# Patient Record
Sex: FEMALE | Race: WHITE | Employment: FULL TIME | URBAN - NONMETROPOLITAN AREA
[De-identification: names, ages, dates, MRNs, and addresses within clinical notes are randomized per-mention and may not be internally consistent; named-entity substitution may affect disease eponyms.]

---

## 2017-12-26 ENCOUNTER — HOSPITAL ENCOUNTER (OUTPATIENT)
Age: 62
Discharge: HOME | End: 2017-12-26
Payer: COMMERCIAL

## 2017-12-26 LAB
ABSOLUTE EOS #: 0.6 K/UL (ref 0–0.4)
ABSOLUTE IMMATURE GRANULOCYTE: ABNORMAL K/UL (ref 0–0.3)
ABSOLUTE LYMPH #: 2.2 K/UL (ref 1–4.8)
ABSOLUTE MONO #: 0.3 K/UL (ref 0–1)
ALBUMIN SERPL-MCNC: 4.2 G/DL (ref 3.5–5.2)
ALBUMIN/GLOBULIN RATIO: 1.4 (ref 1–2.5)
ALP BLD-CCNC: 50 U/L (ref 35–104)
ALT SERPL-CCNC: 19 U/L (ref 5–33)
ANION GAP SERPL CALCULATED.3IONS-SCNC: 13 MMOL/L (ref 9–17)
AST SERPL-CCNC: 21 U/L
BASOPHILS # BLD: 1 % (ref 0–2)
BASOPHILS ABSOLUTE: 0 K/UL (ref 0–0.2)
BILIRUB SERPL-MCNC: 0.51 MG/DL (ref 0.3–1.2)
BUN BLDV-MCNC: 16 MG/DL (ref 8–23)
BUN/CREAT BLD: 22 (ref 9–20)
CALCIUM SERPL-MCNC: 9.8 MG/DL (ref 8.6–10.4)
CHLORIDE BLD-SCNC: 100 MMOL/L (ref 98–107)
CHOLESTEROL/HDL RATIO: 4.5
CHOLESTEROL: 207 MG/DL
CO2: 27 MMOL/L (ref 20–31)
CREAT SERPL-MCNC: 0.72 MG/DL (ref 0.5–0.9)
DIFFERENTIAL TYPE: ABNORMAL
EOSINOPHILS RELATIVE PERCENT: 6 % (ref 0–8)
ESTIMATED AVERAGE GLUCOSE: 134 MG/DL
GFR AFRICAN AMERICAN: >60 ML/MIN
GFR NON-AFRICAN AMERICAN: >60 ML/MIN
GFR SERPL CREATININE-BSD FRML MDRD: ABNORMAL ML/MIN/{1.73_M2}
GFR SERPL CREATININE-BSD FRML MDRD: ABNORMAL ML/MIN/{1.73_M2}
GLUCOSE BLD-MCNC: 148 MG/DL (ref 70–99)
HBA1C MFR BLD: 6.3 % (ref 4.8–5.9)
HCT VFR BLD CALC: 41.6 % (ref 36–46)
HDLC SERPL-MCNC: 46 MG/DL
HEMOGLOBIN: 13.3 G/DL (ref 12–16)
IMMATURE GRANULOCYTES: ABNORMAL %
INSULIN COMMENT: 1153
INSULIN REFERENCE RANGE:: NORMAL
INSULIN: 22 MU/L
IRON: 54 UG/DL (ref 37–145)
LDL CHOLESTEROL: 132 MG/DL (ref 0–130)
LYMPHOCYTES # BLD: 23 % (ref 24–44)
MCH RBC QN AUTO: 25.7 PG (ref 26–34)
MCHC RBC AUTO-ENTMCNC: 32 G/DL (ref 31–37)
MCV RBC AUTO: 80.5 FL (ref 80–100)
MONOCYTES # BLD: 3 % (ref 0–12)
PDW BLD-RTO: 14.5 % (ref 12.1–15.2)
PLATELET # BLD: 258 K/UL (ref 140–450)
PLATELET ESTIMATE: ABNORMAL
PMV BLD AUTO: 9.4 FL (ref 6–12)
POTASSIUM SERPL-SCNC: 5 MMOL/L (ref 3.7–5.3)
RBC # BLD: 5.17 M/UL (ref 4–5.2)
RBC # BLD: ABNORMAL 10*6/UL
SEG NEUTROPHILS: 67 % (ref 36–66)
SEGMENTED NEUTROPHILS ABSOLUTE COUNT: 6.6 K/UL (ref 1.8–7.7)
SODIUM BLD-SCNC: 140 MMOL/L (ref 135–144)
T4 TOTAL: 11.9 UG/DL (ref 4.5–12)
THYROXINE UPTAKE: 35.28 % (ref 28–41)
TOTAL PROTEIN: 7.3 G/DL (ref 6.4–8.3)
TRIGL SERPL-MCNC: 146 MG/DL
TSH SERPL DL<=0.05 MIU/L-ACNC: 0.04 MIU/L (ref 0.3–5)
VLDLC SERPL CALC-MCNC: ABNORMAL MG/DL (ref 1–30)
WBC # BLD: 9.8 K/UL (ref 3.5–11)
WBC # BLD: ABNORMAL 10*3/UL

## 2017-12-26 PROCEDURE — 84436 ASSAY OF TOTAL THYROXINE: CPT

## 2017-12-26 PROCEDURE — 84443 ASSAY THYROID STIM HORMONE: CPT

## 2017-12-26 PROCEDURE — 36415 COLL VENOUS BLD VENIPUNCTURE: CPT

## 2017-12-26 PROCEDURE — 83525 ASSAY OF INSULIN: CPT

## 2017-12-26 PROCEDURE — 80061 LIPID PANEL: CPT

## 2017-12-26 PROCEDURE — 85025 COMPLETE CBC W/AUTO DIFF WBC: CPT

## 2017-12-26 PROCEDURE — 80053 COMPREHEN METABOLIC PANEL: CPT

## 2017-12-26 PROCEDURE — 83036 HEMOGLOBIN GLYCOSYLATED A1C: CPT

## 2017-12-26 PROCEDURE — 84479 ASSAY OF THYROID (T3 OR T4): CPT

## 2017-12-26 PROCEDURE — 83540 ASSAY OF IRON: CPT

## 2023-07-24 ENCOUNTER — HOSPITAL ENCOUNTER
Dept: HOSPITAL 101 - LAB | Age: 68
Discharge: HOME | End: 2023-07-24
Payer: COMMERCIAL

## 2023-07-24 DIAGNOSIS — Z79.899: Primary | ICD-10-CM

## 2023-07-24 DIAGNOSIS — E78.5: ICD-10-CM

## 2023-07-24 DIAGNOSIS — E11.9: ICD-10-CM

## 2023-07-24 DIAGNOSIS — R53.83: ICD-10-CM

## 2023-07-24 DIAGNOSIS — Z12.11: ICD-10-CM

## 2023-07-24 LAB
ADD MANUAL DIFF: NO
ALANINE AMINOTRANSFERASE: 24 U/L (ref 14–59)
ALBUMIN GLOBULIN RATIO: 1
ALBUMIN LEVEL: 3.8 G/DL (ref 3.4–5)
ALKALINE PHOSPHATASE: 62 U/L (ref 46–116)
ANION GAP: 13.4
ASPARTATE AMINO TRANSFERASE: 15 U/L (ref 15–37)
BLOOD UREA NITROGEN: 17 MG/DL (ref 7–18)
CALCIUM: 9.6 MG/DL (ref 8.5–10.1)
CARBON DIOXIDE: 27.3 MMOL/L (ref 21–32)
CHLORIDE: 103 MMOL/L (ref 98–107)
CHOLESTEROL: 209 MG/DL (ref ?–200)
CO2 BLD-SCNC: 27.3 MMOL/L (ref 21–32)
ESTIMATED GFR (AFRICAN AMERICA: 56 (ref 60–?)
ESTIMATED GFR (NON-AFRICAN AME: 46 (ref 60–?)
FREE T3: 2.61 PG/ML (ref 2.18–3.98)
GLOBULIN: 3.7 G/DL
GLUCOSE BLD-MCNC: 206 MG/DL (ref 74–106)
HCT VFR BLD CALC: 39.9 % (ref 36–48)
HDL CHOLESTEROL: 39 MG/DL (ref 40–60)
HEMATOCRIT: 39.9 % (ref 36–48)
HEMOGLOBIN: 12.9 G/DL (ref 12–16)
IMMATURE GRANULOCYTES ABS AUTO: 0.02 10^3/UL (ref 0–0.03)
IMMATURE GRANULOCYTES PCT AUTO: 0.3 % (ref 0–0.5)
LYMPHOCYTES  ABSOLUTE AUTO: 2.2 10^3/UL (ref 1.2–3.8)
MCV RBC: 85.1 FL (ref 81–99)
MEAN CORPUSCULAR HEMOGLOBIN: 27.5 PG (ref 26.7–34)
MEAN CORPUSCULAR HGB CONC: 32.3 G/DL (ref 29.9–35.2)
MEAN CORPUSCULAR VOLUME: 85.1 FL (ref 81–99)
PLATELET # BLD: 245 10^3/UL (ref 150–450)
PLATELET COUNT: 245 10^3/UL (ref 150–450)
POTASSIUM SERPLBLD-SCNC: 4.7 MMOL/L (ref 3.5–5.1)
POTASSIUM: 4.7 MMOL/L (ref 3.5–5.1)
RED BLOOD COUNT: 4.69 10^6/UL (ref 4.2–5.4)
SODIUM BLD-SCNC: 139 MMOL/L (ref 136–145)
SODIUM: 139 MMOL/L (ref 136–145)
THYROID STIMULATING HORMONE: 0.16 UIU/ML (ref 0.36–3.74)
TOTAL PROTEIN: 7.5 G/DL (ref 6.4–8.2)
TRIGLYCERIDES: 251 MG/DL (ref ?–150)
VLDL CHOLESTEROL: 50.2 MG/DL
WBC # BLD: 7.9 10^3/UL (ref 4–11)
WHITE BLOOD COUNT: 7.9 10^3/UL (ref 4–11)

## 2023-07-24 PROCEDURE — 84436 ASSAY OF TOTAL THYROXINE: CPT

## 2023-07-24 PROCEDURE — 36415 COLL VENOUS BLD VENIPUNCTURE: CPT

## 2023-07-24 PROCEDURE — 83036 HEMOGLOBIN GLYCOSYLATED A1C: CPT

## 2023-07-24 PROCEDURE — 80053 COMPREHEN METABOLIC PANEL: CPT

## 2023-07-24 PROCEDURE — 84481 FREE ASSAY (FT-3): CPT

## 2023-07-24 PROCEDURE — 80061 LIPID PANEL: CPT

## 2023-07-24 PROCEDURE — 84443 ASSAY THYROID STIM HORMONE: CPT

## 2023-07-24 PROCEDURE — 85025 COMPLETE CBC W/AUTO DIFF WBC: CPT

## 2023-12-04 ENCOUNTER — HOSPITAL ENCOUNTER
Dept: HOSPITAL 101 - RAD | Age: 68
Discharge: HOME | End: 2023-12-04
Payer: COMMERCIAL

## 2023-12-04 DIAGNOSIS — M54.16: Primary | ICD-10-CM

## 2023-12-04 DIAGNOSIS — M51.36: ICD-10-CM

## 2023-12-04 PROCEDURE — 72100 X-RAY EXAM L-S SPINE 2/3 VWS: CPT

## 2024-09-25 ENCOUNTER — HOSPITAL ENCOUNTER
Dept: HOSPITAL 101 - LAB | Age: 69
Discharge: HOME | End: 2024-09-25
Payer: COMMERCIAL

## 2024-09-25 DIAGNOSIS — Z00.00: Primary | ICD-10-CM

## 2024-09-25 LAB
ADD MANUAL DIFF: NO
ALANINE AMINOTRANSFERASE: 20 U/L (ref 14–59)
ALBUMIN GLOBULIN RATIO: 1.1
ALBUMIN LEVEL: 3.7 G/DL (ref 3.4–5)
ALKALINE PHOSPHATASE: 57 U/L (ref 46–116)
ANION GAP: 13.1
ASPARTATE AMINO TRANSFERASE: 14 U/L (ref 15–37)
BLOOD UREA NITROGEN: 20 MG/DL (ref 7–18)
CALCIUM: 9.8 MG/DL (ref 8.5–10.1)
CARBON DIOXIDE: 26.5 MMOL/L (ref 21–32)
CHLORIDE: 100 MMOL/L (ref 98–107)
CHOLESTEROL: 135 MG/DL (ref ?–200)
CO2 BLD-SCNC: 26.5 MMOL/L (ref 21–32)
ESTIMATED GFR (AFRICAN AMERICA: 46 (ref 60–?)
ESTIMATED GFR (NON-AFRICAN AME: 38 (ref 60–?)
FREE T3: 2.47 PG/ML (ref 2.18–3.98)
GLOBULIN: 3.4 G/DL
GLUCOSE BLD-MCNC: 200 MG/DL (ref 74–106)
HCT VFR BLD CALC: 40.9 % (ref 36–48)
HDL CHOLESTEROL: 46 MG/DL (ref 40–60)
HEMATOCRIT: 40.9 % (ref 36–48)
HEMOGLOBIN: 13.1 G/DL (ref 12–16)
IMMATURE GRANULOCYTES ABS AUTO: 0.02 10^3/UL (ref 0–0.03)
IMMATURE GRANULOCYTES PCT AUTO: 0.2 % (ref 0–0.5)
LYMPHOCYTES  ABSOLUTE AUTO: 2.2 10^3/UL (ref 1.2–3.8)
MCV RBC: 84.3 FL (ref 81–99)
MEAN CORPUSCULAR HEMOGLOBIN: 27 PG (ref 26.7–34)
MEAN CORPUSCULAR HGB CONC: 32 G/DL (ref 29.9–35.2)
MEAN CORPUSCULAR VOLUME: 84.3 FL (ref 81–99)
PLATELET # BLD: 232 10^3/UL (ref 150–450)
PLATELET COUNT: 232 10^3/UL (ref 150–450)
POTASSIUM SERPLBLD-SCNC: 4.6 MMOL/L (ref 3.5–5.1)
POTASSIUM: 4.6 MMOL/L (ref 3.5–5.1)
RED BLOOD COUNT: 4.85 10^6/UL (ref 4.2–5.4)
SODIUM BLD-SCNC: 135 MMOL/L (ref 136–145)
SODIUM: 135 MMOL/L (ref 136–145)
THYROID STIMULATING HORMONE: 0.08 UIU/ML (ref 0.36–3.74)
TOTAL PROTEIN: 7.1 G/DL (ref 6.4–8.2)
TRIGLYCERIDES: 200 MG/DL (ref ?–150)
VLDL CHOLESTEROL: 40 MG/DL
WBC # BLD: 8.7 10^3/UL (ref 4–11)
WHITE BLOOD COUNT: 8.7 10^3/UL (ref 4–11)

## 2024-09-25 PROCEDURE — 84481 FREE ASSAY (FT-3): CPT

## 2024-09-25 PROCEDURE — 80061 LIPID PANEL: CPT

## 2024-09-25 PROCEDURE — 83036 HEMOGLOBIN GLYCOSYLATED A1C: CPT

## 2024-09-25 PROCEDURE — 84443 ASSAY THYROID STIM HORMONE: CPT

## 2024-09-25 PROCEDURE — 84439 ASSAY OF FREE THYROXINE: CPT

## 2024-09-25 PROCEDURE — 80053 COMPREHEN METABOLIC PANEL: CPT

## 2024-09-25 PROCEDURE — 85025 COMPLETE CBC W/AUTO DIFF WBC: CPT

## 2024-09-25 PROCEDURE — 36415 COLL VENOUS BLD VENIPUNCTURE: CPT

## 2024-09-25 NOTE — XMS_ITS
Comprehensive CCD (C-CDA v2.1)  
  
                         Created on: 2024  
  
  
ROULA WANDER S  
External Reference #: CDR,PersonID:[id number]  
: 1955  
Sex: Female  
  
Demographics  
  
  
                                        Address             [address]  [address]  
   
                                        Home Phone          [phone]  
   
                                        Home Phone          [phone]  
   
                                        Home Phone          [phone]  
   
                                        Preferred Language  en  
   
                                        Marital Status      Single  
   
                                        [employer] Affiliation Unknown  
   
                                        Race                White  
   
                                        Ethnic Group        Unknown  
  
  
Author  
  
  
                                        Organization        [facility]  
  
  
Care Team Providers  
  
  
                                Care Team Member Name Role            Phone  
   
                                CYNTHIA BENTLEY  Unavailable     Unavailable  
   
                                HOY, CYNTHIA M  Unavailable     Unavailable  
   
                                HOY, DR MARIE Admitting       Unavailable  
   
                                HOY, DR MARIE Attending       Unavailable  
   
                                HOY, DR MARIE Primary Care    Unavailable  
   
                                HOY, DR MARIE Consulting      Unavailable  
   
                                HOY, DR MARIE Admitting       Unavailable  
   
                                HOY, DR MARIE Attending       Unavailable  
   
                                HOY, DR MARIE Primary Care    Unavailable  
   
                                HOY, DR MARIE Admitting       Unavailable  
   
                                HOY, DR MARIE Attending       Unavailable  
   
                                HOY, DR MARIE Primary Care    Unavailable  
   
                                HOY, DR MARIE Consulting      Unavailable  
   
                                HOY, DR MARIE Admitting       Unavailable  
   
                                HOY, DR MARIE Attending       Unavailable  
   
                                HOY, DR MARIE Primary Care    Unavailable  
   
                                HOY, DR MARIE Consulting      Unavailable  
   
                                HOY, DR MARIE Admitting       Unavailable  
   
                                HOY, DR MARIE Attending       Unavailable  
   
                                HOY, DR MARIE Primary Care    Unavailable  
   
                                HOY, DR MARIE Consulting      Unavailable  
   
                                Zieber, DR Benson Consulting      Unavailable  
  
  
  
Problems  
Active Problems  
  
  
                      Problem Classification Problem    Date       Documented Da  
te Episodic/Chronic  
   
                                                    Abdominal pain  
(4 sources)                             Unspecified   
abdominal pain;   
Translations:   
[UNSPECIFIED   
ABDOMINAL PAIN]                         Onset:   
2022                                          Episodic  
   
                                                    Other aftercare  
(4 sources)                             Other long term   
(current) drug   
therapy;   
Translations: [OTH   
LONG TERM CURRENT   
DRUG THERAPY]                           Onset:   
2022                                          Episodic  
   
                                                    Urinary tract   
infections  
(4 sources)                             Acute cystitis with   
hematuria;   
Translations:   
[ACUTE CYSTITIS   
WITH HEMATURIA]                         Onset:   
2022                                          Episodic  
  
  
Past or Other Problems  
  
  
                      Problem Classification Problem    Date       Documented Da  
te Episodic/Chronic  
   
                                                    Deficiency and other   
anemia  
(2 sources)                             Anemia,   
unspecified;   
Translations:   
[Anemia,   
unspecified]                            Onset:   
2017                                          Episodic  
   
                                                    Diabetes mellitus   
without complication  
(2 sources)                             Other abnormal   
glucose;   
Translations:   
[Other abnormal   
glucose]                                Onset:   
2017                                          Episodic  
   
                                                    Medical   
examination/evaluation  
(2 sources)                             Encounter for   
general adult   
medical examination   
without abnormal   
findings;   
Translations:   
[Encounter for   
general adult   
medical examination   
without abnormal   
findings]                               Onset:   
2017                                          Episodic  
   
                                                    Unclassified  
(2 sources)                             Encounter for   
screening for   
malignant neoplasm   
of rectum;   
Translations:   
[Encounter for   
screening for   
malignant neoplasm   
of rectum]                              Onset:   
2017                                          Episodic  
  
  
  
Results  
  
  
                          Test Name    Value        Interpretation Reference   
[facility]                                   Facility  
   
                                                    CT ABD/PELVIS WO CONon    
   
                                        CT ABD/PELVIS WO CON EXAMINATION: CT   
ABD/PELVIS WO CON  
HISTORY: Abdominal pain   
, left flank pain  
COMPARISON: No relevant   
comparison available.  
TECHNIQUE: Axial,   
Coronal, and Sagittal   
images were created   
without IV contrast.  
Dose reduction   
techniques were   
achieved by using   
automated exposure   
control  
and/or adjustment of mA   
and/or kV according to   
patient size and/or use   
of  
iterative   
reconstruction   
technique.  
FINDINGS:  
LUNG BASES: No   
suspicious pulmonary or   
pleural disease.  
LIVER: No enlargement,   
atrophy, suspicious   
density, or significant   
focal lesion.  
BILIARY:   
Cholecystectomy.  
PANCREAS: No lesion,   
fluid collection, or   
abnormal duct   
dilatation.  
SPLEEN: No enlargement   
or focal lesion.  
ADRENALS: No mass or   
enlargement.  
KIDNEYS: No mass,   
obstruction, or   
calcification.  
BOWEL/MESENTERY: No   
visible mass,   
obstruction, or bowel   
wall thickening.  
AORTA/VASCULAR: No   
aneurysm or dissection.  
RETROPERITONEUM: No   
mass or adenopathy.  
LYMPH NODES: No   
adenopathy.  
URINARY BLADDER: No   
visible focal wall   
thickening, lesion, or   
calculus.  
PELVIC ORGANS: History.  
ABDOMINAL WALL: No mass   
or hernia.  
BONES: Degenerative   
disc disease of lower   
lumbar spine. No bony   
lesion or  
fracture.  
OTHER: Negative.  
IMPRESSION:  
1. No urinary tract   
calculi or obstructive   
uropathy.  
2. Unremarkable bowel.  
3. No specific findings   
to account for   
patient's symptoms.  
Electronically   
authenticated by:   
CARLOS DELGADO Date:   
2022 16:51    Normal                                  [facility]  
   
                                                    XR KUB 1 VIEWon 2022   
   
                                        XR KUB 1 VIEW       EXAMINATION: XR KUB   
1   
VIEW  
HISTORY: Acute cystitis   
; left flank and pelvic   
pain for one day  
COMPARISON: No relevant   
comparison available.  
FINDINGS:  
KIDNEY/URETER - RIGHT:   
No visible renal or   
ureteral   
calcifications.  
KIDNEY/URETER - LEFT:   
No visible renal or   
ureteral   
calcifications.  
PELVIS: No visible   
ureteral   
calcifications. Any   
visible calcifications   
favor  
phleboliths.  
BOWEL: No abnormal   
dilation or deviation.  
BONES: No acute   
abnormality.  
OTHER: Right upper   
quadrant surgical clips   
suggesting   
cholecystectomy.  
IMPRESSION:  
1. No urinary tract   
calculi.  
2. Normal bowel gas   
pattern.  
3. No suspicious   
findings.  
Electronically   
authenticated by:   
CARLOS DELGADO Date:   
2022 21:03    Normal                                  [facility]  
   
                                                    PROF CHEM 8 (BAS METB)on    
   
                      Anion gap [Moles/Vol] 16.3 mmol/L Normal                [facility]  
   
                                        Comment on above:   Performed By: #### B  
MP ####  
[facility]  
[address] [address]  
Dr. Bib Morales   
   
                      Calcium [Mass/Vol] 9.2 mg/dL  Normal     8.5-10.1   [facility]  
   
                                        Comment on above:   Performed By: #### B  
MP ####  
[facility]  
[address] [address]  
Dr. Bib Morales   
   
                      Chloride [Moles/Vol] 102 mmol/L Normal          [facility]  
   
                                        Comment on above:   Performed By: #### B  
MP ####  
[facility]  
[address] [address]  
Dr. Bib Morales   
   
                      CO2 [Moles/Vol] 24.7 mmol/L Normal     21.0-32.0  [facility]  
   
                                        Comment on above:   Performed By: #### B  
MP ####  
[facility]  
[address] [address]  
Dr. Bib Morales   
   
                      Creatinine [Mass/Vol] 1.66 mg/dL Critically high 0.55-1.02  
  [facility]  
   
                                        Comment on above:   Performed By: #### B  
MP ####  
[facility]  
[address] [address]  
Dr. Bib Morales   
   
                      EGFR-AF AMERICAN 38 mL/min/1.73m2 Critically low >=60       
  [facility]  
   
                                        Comment on above:   Performed By: #### B  
MP ####  
[facility]  
[address] [address]  
Dr. Bib Morales   
   
                      EGFR-NON AF AMERICAN 31 mL/min/1.73m2 Critically low >=60   
      [facility]  
   
                                        Comment on above:   Performed By: #### B  
MP ####  
[facility]  
[address] [address]  
Dr. Bib Morales   
   
                      Glucose [Mass/Vol] 160 mg/dL  Critically high      T  
[facility]  
   
                                        Comment on above:   Performed By: #### B  
MP ####  
[facility]  
[address] [place] [address]  
Dr. Bib Morales   
   
                      Potassium [Moles/Vol] 5.0 mmol/L Normal     3.5-5.1    [facility]  
   
                                        Comment on above:   Performed By: #### B  
MP ####  
[facility]  
[address] [place] [address]  
Dr. Bib Morales   
   
                      Sodium [Moles/Vol] 138 mmol/L Normal     136-145    [facility]  
   
                                        Comment on above:   Performed By: #### B  
MP ####  
[facility]  
[address] [place] [address]  
Dr. Bib Morales   
   
                                                    Urea nitrogen   
[Mass/Vol]      30.0 mg/dL      Critically high 7.0-18.0        [facility]  
   
                                        Comment on above:   Performed By: #### B  
MP ####  
[facility]  
[address] [place] [address]  
Dr. Bib Morales   
   
                                                    Urea   
nitrogen/Creatinine   
[Mass ratio]    18.1 mg/mg      Normal                          [facility]  
   
                                        Comment on above:   Performed By: #### B  
MP ####  
[facility]  
[address] [place] [address]  
Dr. Bib Morales   
   
                                                    CBC AUTO DIFFon 2022   
   
                      BASO #     0.1 103/ul Normal     0.0-0.1    [facility]  
   
                                        Comment on above:   Performed By: #### C  
BC ####  
[facility]  
[address] [place] [address]  
Dr. Bib Morales   
   
                                                    Basophils/100 WBC   
(Bld)           0.8 %           Normal          0.2-2.0         [facility]  
   
                                        Comment on above:   Performed By: #### C  
BC ####  
[facility]  
[address] [place] [address]  
Dr. Bib Morales   
   
                      EO #       1.8 103/ul Critically high 0.0-0.7    [facility]  
   
                                        Comment on above:   Performed By: #### C  
BC ####  
[facility]  
[address] [place] [address]  
Dr. Bib Morales   
   
                                                    Eosinophils/100 WBC   
(Bld)           18.3 %          Critically high 0.9-7.0         [facility]  
   
                                        Comment on above:   Performed By: #### C  
BC ####  
[facility]  
[address] [place] [address]  
Dr. Bib Morales   
   
                                                    Erythrocyte   
distribution width   
(RBC) [Ratio]   13.9 %          Normal          11.0-15.0       [facility]  
   
                                        Comment on above:   Performed By: #### C  
BC ####  
[facility]  
[address] [place] [address]  
Dr. Bib Morales   
   
                                                    Hematocrit (Bld)   
[Volume fraction] 38.5 %          Normal          36.0-48.0       [facility]  
   
                                        Comment on above:   Performed By: #### C  
BC ####  
[facility]  
[address] [place] [address]  
Dr. Bib Morales   
   
                                                    Hemoglobin (Bld)   
[Mass/Vol]      12.2 g/dL       Normal          12.0-16.0       [facility]  
   
                                        Comment on above:   Performed By: #### C  
BC ####  
[facility]  
[address] [place] [address]  
Dr. Bib Morales   
   
                      IG #       0.03 10e3/ul Normal     0.00-0.03  [facility]  
   
                                        Comment on above:   Performed By: #### C  
BC ####  
[facility]  
[address] [place] [address]  
Dr. Bib Morales   
   
                      IG %       0.3 %      Normal     0.0-0.5    [facility]  
   
                                        Comment on above:   Performed By: #### C  
BC ####  
[facility]  
[address] [place] [address]  
Dr. Bib Morales   
   
                      LYMPH #    2.1 103/ul Normal     1.2-3.8    [facility]  
   
                                        Comment on above:   Performed By: #### C  
BC ####  
[facility]  
[address] [place] [address]  
Dr. Bib Morales   
   
                                                    Lymphocytes/100 WBC   
(Bld)           21.7 %          Normal          20.5-60.0       [facility]  
   
                                        Comment on above:   Performed By: #### C  
BC ####  
[facility]  
[address] [place] [address]  
Dr. Bib Morales   
   
                      MANUAL DIFF REQ NO         Normal                [facility]  
   
                                        Comment on above:   Performed By: #### C  
BC ####  
[facility]  
[address] [address]  
Dr. Bib Morales   
   
                                                    MCH (RBC) [Entitic   
mass]           27.4 pg         Normal          26.7-34.0       [facility]  
   
                                        Comment on above:   Performed By: #### C  
BC ####  
[facility]  
[address] [place] [address]  
Dr. Bib Morales   
   
                      MCHC (RBC) [Mass/Vol] 31.7 g/dL  Normal     29.9-35.2  [facility]  
   
                                        Comment on above:   Performed By: #### C  
BC ####  
[facility]  
[address] [place] [address]  
Dr. Bib Morales   
   
                                                    MCV (RBC) [Entitic   
vol]            86.5 fL         Normal          81.0-99.0       [facility]  
   
                                        Comment on above:   Performed By: #### C  
BC ####  
[facility]  
[address] [place] [address]  
Dr. Bib Morales   
   
                      MONO #     0.5 103/ul Normal     0.3-0.8    [facility]  
   
                                        Comment on above:   Performed By: #### C  
BC ####  
[facility]  
[address] [place] [address]  
Dr. Bib Morales   
   
                                                    Monocytes/100 WBC   
(Bld)           4.8 %           Normal          1.7-12.0        [facility]  
   
                                        Comment on above:   Performed By: #### C  
BC ####  
[facility]  
[address] [place] [address]  
Dr. Bib Morales   
   
                      NEUT #     5.2 103/ul Normal     1.4-6.5    [facility]  
   
                                        Comment on above:   Performed By: #### C  
BC ####  
[facility]  
[address] [place] [address]  
Dr. Bib Morales   
   
                                                    Neutrophils/100 WBC   
(Bld)           54.1 %          Normal          43.0-75.0       [facility]  
   
                                        Comment on above:   Performed By: #### C  
BC ####  
[facility]  
[address] [place] [address]  
Dr. Bib Morales   
   
                                                    Platelet mean volume   
(Bld) [Entitic vol] 10.1 fL         Normal          9.5-13.5        [facility]  
   
[facility]  
   
                                        Comment on above:   Performed By: #### C  
BC ####  
[facility]  
[address] [address]  
Dr. Bib Morales   
   
                      PLT        290 103/ul Normal     150-450    [facility]  
   
                                        Comment on above:   Performed By: #### C  
BC ####  
[facility]  
[address] [address]  
Dr. Bib Morales   
   
                      RBC        4.45 106/ul Normal     4.20-5.40  [facility]  
   
                                        Comment on above:   Performed By: #### C  
BC ####  
[facility]  
[address] [address]  
Dr. Bib Morales   
   
                      WBC        9.7 103/ul Normal     4.0-11.0   [facility]  
   
                                        Comment on above:   Performed By: #### C  
BC ####  
[facility]  
[address] [place] [address]  
Dr. Bib Morales   
   
                                                    FREE T3on 2022   
   
                      FREE T3    3.43 pg/mlL Normal     2.18-3.98  [facility]  
   
                                        Comment on above:   Performed By: #### T  
4, TSH, CMP, FT3, LIPID ####  
[facility]  
[address] [place] [address]  
Dr. Bib Morales   
   
                                                    GLYCOHEMOGLOBIN A1Con 2022   
   
                      ADA RECOMMENDATION SEE BELOW  Normal                [facility]  
   
                                        Comment on above:   Result Comment: ADA   
RECOMMENDED LIMIT 4.0 - 6.0 ADA   
THERAPEUTIC   
TARGET < 7.0 ACTION SUGGESTED > 7.0   
   
                                                            Performed By: #### A  
1C ####  
[facility]  
[address] [place] [address]  
Dr. Bib Morales   
   
                      Glucose [Mass/Vol] 134 mg/dL  Normal                [facility]  
   
                                        Comment on above:   Performed By: #### A  
1C ####  
[facility]  
[address] [place] [address]  
Dr. Bib Morales   
   
                                                    HbA1c (Bld) [Mass   
fraction]       6.3 %           Critically high 4.5-6.2         [facility]  
   
                                        Comment on above:   Performed By: #### A  
1C ####  
[facility]  
[address] [place] [address]  
Dr. Bib Morales   
   
                                                    LIPID PROFILEon 2022   
   
                      CHOL-HDL RATIO NORM SEE BELOW  Normal                [facility]  
   
                                        Comment on above:   Result Comment: 3.3   
- 4.4 LOW RISK 4.4 - 7.1 AVERAGE RISK 7.1   
-   
11.0 MODERATE RISK >11.0 HIGH RISK   
   
                                                            Performed By: #### T  
4, TSH, CMP, FT3, LIPID ####  
[facility]  
[address] [address]  
Dr. Bib Morales   
   
                                                    Cholesterol   
[Mass/Vol]      236 mg/dL       Critically high <=200           [facility]  
   
                                        Comment on above:   Performed By: #### T  
4, TSH, CMP, FT3, LIPID ####  
[facility]  
[address] [address]  
Dr. Bib Morales   
   
                                                    Cholesterol in HDL   
[Mass/Vol]      43 mg/dL        Normal          40-60           [facility]  
   
                                        Comment on above:   Performed By: #### T  
4, TSH, CMP, FT3, LIPID ####  
[facility]  
[address] [address]  
Dr. Bib Morales   
   
                                                    Cholesterol in LDL   
[Mass/Vol]      136.4 mg/dL     Normal                          [facility]  
   
                                        Comment on above:   Performed By: #### T  
4, TSH, CMP, FT3, LIPID ####  
[facility]  
[address] [address]  
Dr. Bib Morales   
   
                                                    Cholesterol.total/Cho  
lesterol in HDL [Mass   
ratio]          5.5 {ratio}     Normal                          [facility]  
   
                                        Comment on above:   Performed By: #### T  
4, TSH, CMP, FT3, LIPID ####  
[facility]  
[address] [address]  
Dr. Bib Morales   
   
                                        HDL NORMAL          > or = 60 mg/dl - LO  
W   
CARDIOVASCULAR RISK <40   
mg/dl - HIGH   
CARDIOVASCULAR RISK Normal                                  [facility]  
   
                                        Comment on above:   Performed By: #### T  
4, TSH, CMP, FT3, LIPID ####  
[facility]  
[address] [place] [address]  
Dr. Bib Morales   
   
                      LDL CALC NORMAL SEE BELOW  Normal                [facility]  
   
                                        Comment on above:   Result Comment: <100  
 mg/dl OPTIMAL 100 - 129 mg/dl NEAR OR   
ABOVE   
OPTIMAL 130 - 159 mg/dl BORDERLINE HIGH 160 - 189 mg/dl HIGH   
>190 mg/dl VERY HIGH   
   
                                                            Performed By: #### T  
4, TSH, CMP, FT3, LIPID ####  
[facility]  
[address] [place] [address]  
Dr. Bib Morales   
   
                                                    Triglyceride   
[Mass/Vol]      283 mg/dL       Critically high <=150           [facility]  
   
                                        Comment on above:   Performed By: #### T  
4, TSH, CMP, FT3, LIPID ####  
[facility]  
[address] [place] [address]  
Dr. Bib Morales   
   
                      VLDL CALC  56.6 mg/dL Normal                [facility]  
   
                                        Comment on above:   Performed By: #### T  
4, TSH, CMP, FT3, LIPID ####  
[facility]  
[address] [place] [address]  
Dr. Bib Morales   
   
                                                    PROF 14(COMP METB)on   
022   
   
                      Albumin [Mass/Vol] 3.7 g/dL   Normal     3.4-5.0    [facility]  
   
                                        Comment on above:   Performed By: #### T  
4, TSH, CMP, FT3, LIPID ####  
[facility]  
[address] [place] [address]  
Dr. Bib Morales   
   
                                                    Albumin/Globulin   
[Mass ratio]    1.0 {ratio}     Normal                          [facility]  
   
                                        Comment on above:   Performed By: #### T  
4, TSH, CMP, FT3, LIPID ####  
[facility]  
[address] [place] [address]  
Dr. Bib Morales   
   
                                                    ALP [Catalytic   
activity/Vol]   59 U/L          Normal                    [facility]  
   
                                        Comment on above:   Performed By: #### T  
4, TSH, CMP, FT3, LIPID ####  
[facility]  
[address] [place] [address]  
Dr. Bib Morales   
   
                                                    ALT [Catalytic   
activity/Vol]   20 U/L          Normal          14-59           [facility]  
   
                                        Comment on above:   Performed By: #### T  
4, TSH, CMP, FT3, LIPID ####  
[facility]  
[address] [place] [address]  
Dr. Bib Morales   
   
                      Anion gap [Moles/Vol] 14.8 mmol/L Normal                [facility]  
   
                                        Comment on above:   Performed By: #### T  
4, TSH, CMP, FT3, LIPID ####  
[facility]  
[address] [place] [address]  
Dr. Bib Morales   
   
                                                    AST [Catalytic   
activity/Vol]   13 U/L          Critically low  15-37           [facility]  
   
                                        Comment on above:   Performed By: #### T  
4, TSH, CMP, FT3, LIPID ####  
[facility]  
[address] [place] [address]  
Dr. Bib Morales   
   
                      Bilirubin [Mass/Vol] 0.5 mg/dL  Normal     0.2-1.0    [facility]  
   
                                        Comment on above:   Performed By: #### T  
4, TSH, CMP, FT3, LIPID ####  
[facility]  
[address] [place] [address]  
Dr. Bib Morales   
   
                      Calcium [Mass/Vol] 9.7 mg/dL  Normal     8.5-10.1   [facility]  
   
                                        Comment on above:   Performed By: #### T  
4, TSH, CMP, FT3, LIPID ####  
[facility]  
[address] [place] [address]  
Dr. Bib Morales   
   
                      Chloride [Moles/Vol] 102 mmol/L Normal          [facility]   
[facility]  
   
                                        Comment on above:   Performed By: #### T  
4, TSH, CMP, FT3, LIPID ####  
[facility]  
[address] [place] [address]  
Dr. Bib Morales   
   
                      CO2 [Moles/Vol] 25.9 mmol/L Normal     21.0-32.0  [facility]  
   
                                        Comment on above:   Performed By: #### T  
4, TSH, CMP, FT3, LIPID ####  
[facility]  
[address] [place] [address]  
Dr. Bib Morales   
   
                      Creatinine [Mass/Vol] 1.88 mg/dL Critically high 0.55-1.02  
  [facility]  
   
                                        Comment on above:   Performed By: #### T  
4, TSH, CMP, FT3, LIPID ####  
[facility]  
[address] [place] [address]  
Dr. Bib Morales   
   
                      EGFR-AF AMERICAN 33 mL/min/1.73m2 Critically low >=60       
  [facility]  
   
                                        Comment on above:   Performed By: #### T  
4, TSH, CMP, FT3, LIPID ####  
[facility]  
[address] [place] [address]  
Dr. Bib Morales   
   
                      EGFR-NON AF AMERICAN 27 mL/min/1.73m2 Critically low >=60   
      [facility]  
   
                                        Comment on above:   Performed By: #### T  
4, TSH, CMP, FT3, LIPID ####  
[facility]  
[address] [place] [address]  
Dr. Bib Morales   
   
                                                    Globulin (S)   
[Mass/Vol]      3.7 g/dL        Normal                          [facility]  
   
                                        Comment on above:   Performed By: #### T  
4, TSH, CMP, FT3, LIPID ####  
[facility]  
[address] [place] [address]  
Dr. Bib Morales   
   
                      Glucose [Mass/Vol] 154 mg/dL  Critically high      [facility]  
   
                                        Comment on above:   Performed By: #### T  
4, TSH, CMP, FT3, LIPID ####  
[facility]  
[address] [place] [address]  
Dr. Bib Morales   
   
                      Potassium [Moles/Vol] 5.7 mmol/L Critically high 3.5-5.1    
  [facility]  
   
                                        Comment on above:   Performed By: #### T  
4, TSH, CMP, FT3, LIPID ####  
[facility]  
[address] [place] [address]  
Dr. Bib Morales   
   
                      Protein [Mass/Vol] 7.4 g/dL   Normal     6.4-8.2    [facility]  
   
                                        Comment on above:   Performed By: #### T  
4, TSH, CMP, FT3, LIPID ####  
[facility]  
[address] [place] [address]  
Dr. Bib Morales   
   
                      Sodium [Moles/Vol] 137 mmol/L Normal     136-145    [facility]  
   
                                        Comment on above:   Performed By: #### T  
4, TSH, CMP, FT3, LIPID ####  
[facility]  
[address] [place] [address]  
Dr. Bib Morales   
   
                                                    Urea nitrogen   
[Mass/Vol]      24.0 mg/dL      Critically high 7.0-18.0        [facility]  
   
                                        Comment on above:   Performed By: #### T  
4, TSH, CMP, FT3, LIPID ####  
[facility]  
[address] [address]  
Dr. Bib Morales   
   
                                                    Urea   
nitrogen/Creatinine   
[Mass ratio]    12.8 mg/mg      Normal                          [facility]  
   
                                        Comment on above:   Performed By: #### T  
4, TSH, CMP, FT3, LIPID ####  
[facility]  
[address] [address]  
Dr. Bib Morales   
   
                                                    T4on 2022   
   
                      T4 [Mass/Vol] 14.70 ug/dL Critically high 4.80-13.90 [facility]  
   
                                        Comment on above:   Performed By: #### T  
4, TSH, CMP, FT3, LIPID ####  
[facility]  
[address] [address]  
Dr. Bib Morales   
   
                                                    TSHon 2022   
   
                      TSH        0.073 uIU/mL Critically low 0.358-3.740 [facility]  
   
                                        Comment on above:   Performed By: #### T  
4, TSH, CMP, FT3, LIPID ####  
[facility]  
[address] [address]  
Dr. Bib Morales   
   
                      TSH RANGE  SEE BELOW  Normal                [facility]  
   
                                        Comment on above:   Result Comment: <0.3  
4 UIU/ml HYPERTHYROID 0.34-5.60 UIU/ml   
EUTHYROID >5.60 UIU/ml HYPOTHYROID   
   
                                                            Performed By: #### T  
4, TSH, CMP, FT3, LIPID ####  
[facility]  
[address] [address]  
Dr. Bib Morales   
   
                                                    VITAMIN D 25 OHon 2022  
   
   
                      VIT D 25-OH 62.1 ng/mL Normal                [facility]  
   
                                        Comment on above:   Performed By: #### V  
ITAD ####[id number]   
[place] [address]Dr. Bib Morales   
   
                      VIT D RANGES SEE BELOW  Normal                [facility]  
   
                                        Comment on above:   Result Comment: <20   
ng/mL Vit D deficient 20 - <30 ng/mL Vit D  
   
insufficient 30 - 100 ng/mL Vit D sufficient >100 ng/mL   
Potential Toxicity   
   
                                                            Performed By: #### V  
ITAD ####[facility] [id number]   
[place] [address]Dr. Bib Morales   
   
                                                    CBC with Diffon 2017   
   
                      Abs. Basophil 0.00 k/uL  Normal     0.0-0.2    [facility]  
   
                                        Comment on above:   Result Comment: Perf  
ormed at [address] [place] [address] [phone]   
   
                                                            Performed By: #### C  
DP, CP, LIPR, T4, TSH, FE, GLYHGB ####[address] , [place] [phone]#### INSU ####[address] [phone] [place] [phone]#### TU ####[address] [phone]   
   
                      Abs.Neutrophil (Seg) 6.60 k/uL  Normal     1.8-7.7    [facility]  
   
                                        Comment on above:   Performed By: #### C  
DP, CP, LIPR, T4, TSH, FE, GLYHGB   
####[address] [place]   
[phone]#### INSU ####[address] [phone] [place] [phone]#### TU ####[address] [phone]   
   
                                                    Basophils/100 WBC   
Auto (Bld)      1 %             Normal          0-2             [facility]  
   
                                        Comment on above:   Performed By: #### C  
DP, CP, LIPR, T4, TSH, FE, GLYHGB   
####[address] [place] [phone]#### INSU ####[address] [phone] [place] [phone]#### TU ####[address] [phone]   
   
                      Eosinophils 0.60 10*3/uL High       0.0-0.4    [facility]  
   
                                        Comment on above:   Performed By: #### C  
DP, CP, LIPR, T4, TSH, FE, GLYHGB   
####[address] [place]   
[phone]#### INSU ####[address] [phone] [place] [phone]#### TU ####[address] [phone]   
   
                                                    Eosinophils/100   
leukocytes      6 %             Normal          0-8             [facility]  
   
                                        Comment on above:   Performed By: #### C  
DP, CP, LIPR, T4, TSH, FE, GLYHGB   
####[address] [place]   
[phone]#### INSU ####[address] [phone] [place] [phone]#### TU ####[address] [phone]   
   
                                                    Erythrocyte   
distribution width   
Auto Ratio (RBC) 14.5 %          Normal          12.1-15.2       [facility]  
   
                                        Comment on above:   Performed By: #### C  
DP, CP, LIPR, T4, TSH, FE, GLYHGB   
####[address] [place]   
[phone]#### INSU ####[address] [phone] [place] [phone]#### TU ####[address] [phone]   
   
                      Erythrocytes (RBC) 5.17 10*6/uL Normal     4.0-5.2    [facility]  
   
                                        Comment on above:   Performed By: #### C  
DP, CP, LIPR, T4, TSH, FE, GLYHGB   
####[address] [place]   
[phone]#### INSU ####[address] [phone] [place] [phone]#### TU ####[address] [phone]   
   
                      Hematocrit (HCT) 41.6 %     Normal     36-46      [facility]  
   
                                        Comment on above:   Performed By: #### C  
DP, CP, LIPR, T4, TSH, FE, GLYHGB   
####[address] [place]   
[phone]#### INSU ####[address] [phone] [place] [phone]#### TU ####[address] [phone]   
   
                                                    Hemoglobin mass conc   
(Bld)           13.3 g/dL       Normal          12.0-16.0       [facility]  
   
                                        Comment on above:   Performed By: #### C  
DP, CP, LIPR, T4, TSH, FE, GLYHGB   
####[address] [place]   
[phone]#### INSU ####[address] [phone] [place] [phone]#### TU ####[address] [phone]   
   
                      Lymphocytes 2.20 10*3/uL Normal     1.0-4.8    [facility]  
   
                                        Comment on above:   Performed By: #### C  
DP, CP, LIPR, T4, TSH, FE, GLYHGB   
####[address] [place]   
[phone]#### INSU ####[address] [phone] [place] [phone]#### TU ####[address] [phone]   
   
                                                    Lymphocytes/100   
leukocytes      23 %            Low             24-44           [facility]  
   
                                        Comment on above:   Performed By: #### C  
DP, CP, LIPR, T4, TSH, FE, GLYHGB   
####[address] [place]   
[phone]#### INSU ####[address] [phone] [place] [phone]#### TU ####[address] [phone]   
   
                      MCH        25.7 pg    Low        26-34      [facility]  
   
                                        Comment on above:   Performed By: #### C  
DP, CP, LIPR, T4, TSH, FE, GLYHGB   
####[address] [place]   
[phone]#### INSU ####[address] [phone] [place] [phone]#### TU ####[address] [phone]   
   
                      MCHC mass conc (RBC) 32.0 g/dL  Normal     31-37      [facility]  
   
                                        Comment on above:   Performed By: #### C  
DP, CP, LIPR, T4, TSH, FE, GLYHGB   
####[address] [place]   
[phone]#### INSU ####[address] [phone] [place] [phone]#### TU ####[address] [phone]   
   
                      MCV        80.5 fL    Normal          [facility]  
   
                                        Comment on above:   Performed By: #### C  
DP, CP, LIPR, T4, TSH, FE, GLYHGB   
####[address] [place]   
[phone]#### INSU ####[address] [phone] [place] [phone]#### TU ####[address] [phone]   
   
                      Monocytes  0.30 10*3/uL Normal     0.0-1.0    [facility]  
   
                                        Comment on above:   Performed By: #### C  
DP, CP, LIPR, T4, TSH, FE, GLYHGB   
####[address] [place]   
[phone]#### INSU ####[address] [phone] [place] [phone]#### TU ####[address] [phone]   
   
                                                    Monocytes/100   
leukocytes      3 %             Normal          0-12            [facility]  
   
                                        Comment on above:   Performed By: #### C  
DP, CP, LIPR, T4, TSH, FE, GLYHGB   
####[address] [place]   
[phone]#### INSU ####[address] [phone] [place] [phone]#### TU ####[address] [phone]   
   
                      Neutrophil (Seg) 67 %       High       36-66      [facility]  
   
                                        Comment on above:   Performed By: #### C  
DP, CP, LIPR, T4, TSH, FE, GLYHGB   
####[address] [place]   
[phone]#### INSU ####[address] [phone] [place] [phone]#### TU ####[address] [phone]   
   
                                                    Platelet mean volume   
(PMV)           9.4 fL          Normal          6.0-12.0        [facility]  
   
                                        Comment on above:   Performed By: #### C  
DP, CP, LIPR, T4, TSH, FE, GLYHGB   
####[address] [place]   
[phone]#### INSU ####[address] [phone] [place] [phone]#### TU ####[address] [phone]   
   
                      Platelets  258 10*3/uL Normal     140-450    [facility]  
   
                                        Comment on above:   Performed By: #### C  
DP, CP, LIPR, T4, TSH, FE, GLYHGB   
####[address] [place]   
[phone]#### INSU ####[address] [phone] [place] [phone]#### TU ####[address] [phone]   
   
                      WBC (Leukocytes) 9.8 10*3/uL Normal     3.5-11.0   [facility]  
   
                                        Comment on above:   Performed By: #### C  
DP, CP, LIPR, T4, TSH, FE, GLYHGB   
####[address] [place]   
[phone]#### INSU ####[address] [phone] [place] [phone]#### TU ####[address] [phone]   
   
                      Auto Diff Performed NOT REPORTED Normal                [facility]  
   
                                        Comment on above:   Performed By: #### C  
DP, CP, LIPR, T4, TSH, FE, GLYHGB   
####[address] [place]   
[phone]#### INSU ####[address] [phone] [place] [phone]#### TU ####[address] [phone]   
   
                                                    Erythrocyte   
morphology      NOT REPORTED    Normal                          [facility]  
   
                                        Comment on above:   Performed By: #### C  
DP, CP, LIPR, T4, TSH, FE, GLYHGB   
####[address] [place]   
[phone]#### INSU ####[address] [phone] [place] [phone]#### TU ####[address] [phone]   
   
                                                    Granulocytes/100 WBC   
(Bld)           NOT REPORTED    Normal          0.00-0.30       [facility]  
   
                                        Comment on above:   Performed By: #### C  
DP, CP, LIPR, T4, TSH, FE, GLYHGB   
####[address] [place]   
[phone]#### INSU ####[address] [phone] [place] [phone]#### TU ####[address] [phone]   
   
                                                    Immature granulocytes   
#/vol (Bld)     NOT REPORTED    Normal          0               [facility]  
   
                                        Comment on above:   Performed By: #### C  
DP, CP, LIPR, T4, TSH, FE, GLYHGB   
####[address] [place]   
[phone]#### INSU ####[address] [phone] [place] [phone]#### TU ####[address] [phone]   
   
                      Platelets  NOT REPORTED Normal                [facility]  
   
                                        Comment on above:   Performed By: #### C  
DP, CP, LIPR, T4, TSH, FE, GLYHGB   
####[address] [place]   
[phone]#### INSU ####[address] [phone] [place] [phone]#### TU ####[address] [phone]   
   
                      WBC Morphology NOT REPORTED Normal                [facility]  
   
                                        Comment on above:   Performed By: #### C  
DP, CP, LIPR, T4, TSH, FE, GLYHGB   
####[address] [place]   
[phone]#### INSU ####[address] [phone] [place] [phone]#### TU ####[address] [phone]   
   
                                                    Comp Metabolic Profon 2017   
   
                      (cont.)               Normal                [facility]  
   
                                        Comment on above:   Result Comment: Aver  
age GFR for 60-69 years old: 85   
mL/min/1.73sq mChronic Kidney Disease: <60 mL/min/1.73sq mKidney   
failure: <15 mL/min/1.73sq meGFR calculated using average adult   
body mass. Additional eGFR calculator available   
at:http://www.[employer].com/multiple_crcl_2012.htm   
   
                                                            Performed By: #### C  
DP, CP, LIPR, T4, TSH, FE, GLYHGB ####[address] [place]   
[phone]#### INSU ####[address] [phone] [place] [phone]#### TU ####[address] [phone]   
   
                                                    Alanine   
aminotransferase   
(ALT)           19 U/L          Normal          5-33            [facility]  
   
                                        Comment on above:   Performed By: #### C  
DP, CP, LIPR, T4, TSH, FE, GLYHGB   
####[address] [place]   
[phone]#### INSU ####[address] [phone] [place] [phone]#### TU ####[address] [phone]   
   
                      Albumin    4.2 g/dL   Normal     3.5-5.2    [facility]  
   
                                        Comment on above:   Performed By: #### C  
DP, CP, LIPR, T4, TSH, FE, GLYHGB   
####[address] [place]   
[phone]#### INSU ####[address] [phone] [place] [phone]#### TU ####[address] [phone]   
   
                                                    Albumin/Globulin   
Ratio           1.4 {ratio}     Normal          1.0-2.5         [facility]  
   
                                        Comment on above:   Performed By: #### C  
DP, CP, LIPR, T4, TSH, FE, GLYHGB   
####[address] [place]   
[phone]#### INSU ####[address] [phone] [place] [phone]#### TU ####[address] [phone]   
   
                      Alkaline Phos 50 U/L     Normal          [facility]  
   
                                        Comment on above:   Performed By: #### C  
DP, CP, LIPR, T4, TSH, FE, GLYHGB   
####[address] , [place]   
[phone]#### INSU ####[address] [phone] [place] [phone]#### TU ####[address] [phone]   
   
                      Anion gap  13 mmol/L  Normal     9-17       [facility]  
   
                                        Comment on above:   Performed By: #### C  
DP, CP, LIPR, T4, TSH, FE, GLYHGB   
####[address] , [place]   
[phone]#### INSU ####[address] [phone] [place] [phone]#### TU ####[address] [phone]   
   
                                                    Aspartate   
aminotransferase   
(AST)           21 U/L          Normal          <32             [facility]  
   
                                        Comment on above:   Performed By: #### C  
DP, CP, LIPR, T4, TSH, FE, GLYHGB   
####[address] , [place]   
[phone]#### INSU ####[address] [place] [phone] [place] [phone]#### TU ####[address] [phone]   
   
                      Bilirubin Ql (U) 0.51 mg/dL Normal     0.3-1.2    [facility]  
   
                                        Comment on above:   Performed By: #### C  
DP, CP, LIPR, T4, TSH, FE, GLYHGB   
####[address] , [place]   
[phone]#### INSU ####[address] [phone] [place] [phone]#### TU ####[address] [phone]   
   
                      BUN/CRE Ratio 22         High       9-20       [facility]  
   
                                        Comment on above:   Performed By: #### C  
DP, CP, LIPR, T4, TSH, FE, GLYHGB   
####[address] [place]   
[phone]#### INSU ####[address] [phone] [place] [phone]#### TU ####[address] [phone]   
   
                      Calcium    9.8 mg/dL  Normal     8.6-10.4   [facility]  
   
                                        Comment on above:   Performed By: #### C  
DP, CP, LIPR, T4, TSH, FE, GLYHGB   
####[address] [place]   
[phone]#### INSU ####[address] [phone] [place] [phone]#### TU ####[address] [phone]   
   
                      Chloride   100 mmol/L Normal          [facility]  
   
                                        Comment on above:   Performed By: #### C  
DP, CP, LIPR, T4, TSH, FE, GLYHGB   
####[address] [place]   
[phone]#### INSU ####[address] [phone] [place] [phone]#### TU ####[address] [phone]   
   
                      CO2        27 mmol/L  Normal     20-31      [facility]  
   
                                        Comment on above:   Performed By: #### C  
DP, CP, LIPR, T4, TSH, FE, GLYHGB   
####[address] [place]   
[phone]#### INSU ####[address] [phone] [place] [phone]#### TU ####[address] [phone]   
   
                      Creatinine 0.72 mg/dL Normal     0.50-0.90  [facility]  
   
                                        Comment on above:   Performed By: #### C  
DP, CP, LIPR, T4, TSH, FE, GLYHGB   
####[address] [place]   
[phone]#### INSU ####[address] [phone] [place] [phone]#### TU ####[address] [phone]   
   
                      eGFR (non-black) mL/min/{1.73_m2} Normal     >60        Me  
[facility]  
   
                                        Comment on above:   Performed By: #### C  
DP, CP, LIPR, T4, TSH, FE, GLYHGB   
####[address] [place]   
[phone]#### INSU ####[address] [phone] [place] [phone]#### TU ####[address] [phone]   
   
                      Glucose mass conc 148 mg/dL  High       70-99      [facility]  
   
                                        Comment on above:   Performed By: #### C  
DP, CP, LIPR, T4, TSH, FE, GLYHGB   
####[address] , [place]   
[phone]#### INSU ####[address] [phone] [place] [phone]#### TU ####[address] [phone]   
   
                      Potassium molar conc 5.0 mmol/L Normal     3.7-5.3    [facility]  
   
                                        Comment on above:   Performed By: #### C  
DP, CP, LIPR, T4, TSH, FE, GLYHGB   
####[address] [place]   
[phone]#### INSU ####[address] [phone] [place] [phone]#### TU ####[address] [phone]   
   
                      Protein    7.3 g/dL   Normal     6.4-8.3    [facility]  
   
                                        Comment on above:   Performed By: #### C  
DP, CP, LIPR, T4, TSH, FE, GLYHGB   
####[address] [place] [address] [place]   
[phone]#### INSU ####[address] [phone] [place] [phone]#### TU ####[address] [phone]   
   
                      Sodium     140 mmol/L Normal     135-144    [facility]  
   
                                        Comment on above:   Performed By: #### C  
DP, CP, LIPR, T4, TSH, FE, GLYHGB   
####[address] , [place]   
[phone]#### INSU ####[address] [phone] [place] [phone]#### TU ####[address] [phone]   
   
                      Staging:              Normal                [facility]  
   
                                        Comment on above:   Result Comment: Stag  
e 1: Some kidney damage normal GFRStage 2:  
   
Mild kidney damage GFR 60-89Stage 3: Moderate kidney damage GFR   
30-59Stage 4: Severe kidney damage GFR 15-29Stage 5: Severe   
kidney damage GFR <15ESRD - chronic treatment by dialysis or   
transplantPerformed at [address] [place] [address] [phone]   
   
                                                            Performed By: #### C  
DP, CP, LIPR, T4, TSH, FE, GLYHGB ####[address] [place]   
[phone]#### INSU ####[address] [phone] [place] [phone]#### TU ####[address] [phone]   
   
                      Urea nitrogen 16 mg/dL   Normal     8-       [facility]  
   
                                        Comment on above:   Performed By: #### C  
DP, CP, LIPR, T4, TSH, FE, GLYHGB   
####[address] [place]   
[phone]#### INSU ####[address] [phone] [place] [phone]#### TU ####[address] [phone]   
   
                                                    Hemoglobin A1Con 2017   
   
                      Glucose mass conc 134 mg/dL  Normal                [facility]  
   
                                        Comment on above:   Result Comment: The   
ADA and AACC recommend providing the   
estimated average glucose result to permit better patient   
understanding of their HBA1c result.Performed at [address] [place] [address]   
[phone]   
   
                                                            Performed By: #### C  
DP, CP, LIPR, T4, TSH, FE, GLYHGB ####[address] [place]   
[phone]#### INSU ####[address] [phone] [place] [phone]#### TU ####[address] [phone]   
   
                                                    Hemoglobin   
A1c/Hemoglobin.total   
mass fraction (Bld) 6.3 %           High            4.8-5.9         [facility]  
   
                                        Comment on above:   Performed By: #### C  
DP, CP, LIPR, T4, TSH, FE, GLYHGB   
####[address] [place]   
[phone]#### INSU ####[address] [phone] [place] [phone]#### TU ####[address] [phone]   
   
                                                    Insulinon 2017   
   
                      Insulin    22.0 mU/L  Normal                [facility]  
   
                                        Comment on above:   Performed By: #### C  
DP, CP, LIPR, T4, TSH, FE, GLYHGB   
####[address] [place]   
[phone]#### INSU ####[address] [phone] [place] [phone]#### TU ####[address] [phone]   
   
                      Reference Range            [facility]  
   
                                        Comment on above:   Result Comment: Fast  
in.6-24.930 min: 20-37635 min:   
   
min: 26-13221 min: 22-79Performed at [address] [address] [phone]   
   
                                                            Performed By: #### C  
DP, CP, LIPR, T4, TSH, FE, GLYHGB ####[address] [place]   
[phone]#### INSU ####[address] [phone] [place] [phone]#### TU ####[address] [phone]   
   
                      Collection Info. 1153       Normal                [facility]  
   
                                        Comment on above:   Result Comment: Perf  
ormed at [address] [place] [address] [phone]   
   
                                                            Performed By: #### C  
DP, CP, LIPR, T4, TSH, FE, GLYHGB ####[address] [place]   
[phone]#### INSU ####[address] [phone] [place] [phone]#### TU ####[address] [phone]   
   
                                                    Ironon 2017   
   
                      Iron       54 ug/dL   Normal          [facility]  
   
                                        Comment on above:   Result Comment: Perf  
ormed at [address] [place] [address] [phone]   
   
                                                            Performed By: #### C  
DP, CP, LIPR, T4, TSH, FE, GLYHGB ####[address] [place] [address] [place]   
[phone]#### INSU ####[address] [phone] [place] [phone]#### TU ####[address] [phone]   
   
                                                    Lipid Profileon 2017   
   
                      Cholesterol 207 mg/dL  High       <200       [facility]  
   
                                        Comment on above:   Result Comment: Chol  
esterol Guidelines: <200 Desirable 200-240  
   
Borderline >240 Undesirable   
   
                                                            Performed By: #### C  
DP, CP, LIPR, T4, TSH, FE, GLYHGB ####[address] [place]   
[phone]#### INSU ####[address] [phone] [place] [phone]#### TU ####[address] [phone]   
   
                                                    Cholesterol to HDL   
Ratio           4.5 {ratio}     Normal          <5              [facility]  
   
                                        Comment on above:   Performed By: #### C  
DP, CP, LIPR, T4, TSH, FE, GLYHGB   
####[address] [place]   
[phone]#### INSU ####[address] [phone] [place] [phone]#### TU ####[address] [phone]   
   
                      HDL Cholesterol 46 mg/dL   Normal     >40        [facility]  
   
                                        Comment on above:   Result Comment: HDL   
Guidelines: <40 Undesirable 40-59   
Borderline   
>59 Desirable   
   
                                                            Performed By: #### C  
DP, CP, LIPR, T4, TSH, FE, GLYHGB ####[address] , [place]   
[phone]#### INSU ####[address] [phone] [place] [phone]#### TU ####[address] [phone]   
   
                      LDL Cholesterol 132 mg/dL  High       0-130      [facility]  
   
                                        Comment on above:   Result Comment: LDL   
Guidelines: <100 Desirable 100-129 Near   
to/above Desirable 130-159 Borderline >159 UndesirableDirect   
(measured) LDL and calculated LDL are not interchangeable tests.   
   
                                                            Performed By: #### C  
DP, CP, LIPR, T4, TSH, FE, GLYHGB ####[address] [place]   
[phone]#### INSU ####[address] [place] [phone] [place] [phone]#### TU ####[address] [phone]   
   
                      Triglyceride 146 mg/dL  Normal     <150       [facility]  
   
                                        Comment on above:   Result Comment: Trig  
lyceride Guidelines: <150 Desirable 150-  
199   
Borderline 200-499 High >499 Very high Based on AHA Guidelines   
for fasting triglyceride, 2012.Performed at [address] [place] [address]   
[phone]   
   
                                                            Performed By: #### C  
DP, CP, LIPR, T4, TSH, FE, GLYHGB ####[address] [place]   
[phone]#### INSU ####[address] [phone] [place] [phone]#### TU ####[address] [phone]   
   
                                                    Cholesterol in VLDL   
mass conc       NOT REPORTED    Normal                      [facility]  
   
                                        Comment on above:   Performed By: #### C  
DP, CP, LIPR, T4, TSH, FE, GLYHGB   
####[address] [place]   
[phone]#### INSU ####[address] [phone] [place] [phone]#### TU ####[address] [phone]   
   
                                                    Thyroid Stim. Horm.on 2017   
   
                                                    Thyroid stimulating   
hormone (TSH)   0.04 m[IU]/L    Low             0.30-5.00       [facility]  
   
                                        Comment on above:   Result Comment: Perf  
ormed at [address] [place] [address] [phone]   
   
                                                            Performed By: #### C  
DP, CP, LIPR, T4, TSH, FE, GLYHGB ####[address] [place]   
[phone]#### INSU ####[address] [phone] [place] [phone]#### TU ####[address] [phone]   
   
                                                    Thyroxine T4on 2017   
   
                      Thyroxine (T4) 11.9 ug/dL Normal     4.5-12.0   [facility]  
in   
Hospital  
   
                                        Comment on above:   Result Comment: Perf  
ormed at [address] [place] [address] [phone]   
   
                                                            Performed By: #### C  
DP, CP, LIPR, T4, TSH, FE, GLYHGB ####[address] [place] [address] [place]   
[phone]#### INSU ####[address] [phone] [place] [phone]#### TU ####[address] [phone]   
   
                                                    Thyroxine Uptakeon   
7   
   
                      Thyroxine (T4) 35.28 %    Normal     28-41      [facility]  
in   
Hospital  
   
                                        Comment on above:   Result Comment: Perf  
ormed at [address] [address] [phone]   
   
                                                            Performed By: #### C  
DP, CP, LIPR, T4, TSH, FE, GLYHGB ####[address] [place]   
[phone]#### INSU ####[address] [place] [phone] [place] [phone]#### TU ####[facility]   
[id number] [place] [phone]   
  
  
  
Encounters  
  
  
                          Encounter Date Encounter Type Care Provider Facility  
   
                                                    Start: 2022  
End: 2022     ambulatory          DR CYNTHIA BENTLEY      Facility[facility]  
   
                                                    Start: 2022  
End: 2022     ambulatory          DR CYNTHIA BENTLEY      Facility[facility]  
   
                                        Start: 2022   Encounter for genera  
l adult   
medical examination without   
abnormal findings         DR CYNTHIA BENTLEY            [facility]  
   
                                                    Start: 2022  
End: 2022     ambulatory          DR CYNTHIA BENTLEY      Facility[facility]  
   
                                                    Start: 2022  
End: 2022     ambulatory          DR CYNTHIA BENTLEY      Facility[facility]  
   
                                                    Start: 2022  
End: 2022                         Encounter for general adult   
medical examination without   
abnormal findings         DR CYNTHIA BENTLEY            Facility[facility]  
   
                          Start: 2021 ambulatory   DR CYNTHIA BENTLEY Facility  
:[facility]  
   
                                                    Start: 2017  
End: 2017     Ambulatory          CYNTHIA [facility]anc[facility]  
l  
  
  
  
Procedures  
  
  
                          Date         Procedure    Procedure Detail Performing   
Clinician  
   
                          Start: 2017 CBC WITH AUTO DIFFERENTIAL            
    CYNTHIA BENTLEY  
   
                          Start: 2017 COMPREHENSIVE METABOLIC PANEL         
       CYNTHIA HOY  
   
                          Start: 2017 HEMOGLOBIN A1C              CYNTHIA   
ADALBERTOY  
   
                          Start: 2017 INSULIN, TOTAL              CYNTHIA   
HOY  
   
                          Start: 2017 IRON                      CYNTHIA HO  
Y  
   
                          Start: 2017 Lipid panel               CYNTHIA HO  
Y  
   
                          Start: 2017 T3, UPTAKE                CYNTHIA HO  
Y  
   
                          Start: 2017 T4                        CYNTHIA HO  
Y  
   
                          Start: 2017 TSH WITHOUT REFLEX              [facility]  
[place]  
  
  
  
Payers  
  
  
                          Date         Payer Category Payer        Policy ID  
   
                          2016   Private Health Insurance              [id number]  
[id number]  
   
                          1960   Self-pay                  [id number]  
   
                          1960   Unknown                   [id number]  
   
                          1955   Unknown                   [id number] 2.16.84  
[id number]  
   
                          1955   Unknown                   [id number] 2.16.84  
[id number]  
   
                          1955   Unknown                   [id number] 2.16.84  
[id number]  
   
                          1955   Unknown                   [id number] 2.16.84  
[id number]  
   
                          1955   Unknown                   [id number] 2.16.84  
[id number]  
  
  
  
Summary Purpose  
  
  
                                                      
  
  
  
Family History  
No Family History Records FoundNo Family History Records Found  
  
Advance Directives  
No Advanced Directives Records FoundNo Advanced Directives Records Found  
  
Additional Source Comments  
  
  
  
                                                    INFORMATION SOURCE (unrecogn  
ized section and content)  
   
                                          
  
  
  
                                        DATE CREATED        AUTHOR  
   
                                2018                      [facility]  
  
  
  
                                DATE CREATED    AUTHOR          AUTHOR'S CHERI GARCIA  
   
                                2022                      [facility]  
  
  
FOR RECORDS PERTAINING TO PATIENTS WHO ARE OR HAVE BEEN ENROLLED IN A CHEMICAL 
DEPENDENCY/SUBSTANCEABUSE PROGRAM, SOME INFORMATION MAY BE OMITTED. This 
clinical summary was aggregated from multiple sources. Caution should be 
exercised in using it in the provision of clinical care. This summary normalizes
 information from multiple sources, and as a consequence, information in this 
document may materially change the coding, format and clinical context of 
patient data. In addition, data may be omitted in some cases. CLINICAL DECISIONS
 SHOULD BE BASED ON THE PRIMARY CLINICAL RECORDS. [facility]. provides
 no warranty or guarantee of the accuracy or completeness of information in this
 document.